# Patient Record
Sex: MALE | URBAN - METROPOLITAN AREA
[De-identification: names, ages, dates, MRNs, and addresses within clinical notes are randomized per-mention and may not be internally consistent; named-entity substitution may affect disease eponyms.]

---

## 2024-06-26 ENCOUNTER — TELEPHONE (OUTPATIENT)
Age: 59
End: 2024-06-26

## 2024-06-26 ENCOUNTER — OFFICE VISIT (OUTPATIENT)
Age: 59
End: 2024-06-26

## 2024-06-26 VITALS
HEART RATE: 66 BPM | OXYGEN SATURATION: 96 % | BODY MASS INDEX: 32.4 KG/M2 | SYSTOLIC BLOOD PRESSURE: 139 MMHG | DIASTOLIC BLOOD PRESSURE: 86 MMHG | HEIGHT: 72 IN | TEMPERATURE: 97.5 F | WEIGHT: 239.2 LBS

## 2024-06-26 DIAGNOSIS — D17.21 LIPOMA OF RIGHT UPPER EXTREMITY: Primary | ICD-10-CM

## 2024-06-26 RX ORDER — MONTELUKAST SODIUM 10 MG/1
10 TABLET ORAL DAILY
COMMUNITY
Start: 2024-06-10

## 2024-06-26 RX ORDER — METHYLPREDNISOLONE 4 MG/1
4 TABLET ORAL DAILY
COMMUNITY
Start: 2024-06-10

## 2024-06-26 ASSESSMENT — PATIENT HEALTH QUESTIONNAIRE - PHQ9
2. FEELING DOWN, DEPRESSED OR HOPELESS: NOT AT ALL
1. LITTLE INTEREST OR PLEASURE IN DOING THINGS: NOT AT ALL
SUM OF ALL RESPONSES TO PHQ QUESTIONS 1-9: 0
SUM OF ALL RESPONSES TO PHQ9 QUESTIONS 1 & 2: 0

## 2024-06-26 NOTE — PROGRESS NOTES
Subjective:       Aristeo Corado is a 58 y.o. male who presents for evaluation of lump on his shoulder. He has had it for a couple of years but its getting bigger. He is having discomfort from it.       Past Medical History:   Diagnosis Date    Asthma      Past Surgical History:   Procedure Laterality Date    TONSILLECTOMY      as a child     Social History     Socioeconomic History    Marital status: Unknown     Spouse name: None    Number of children: None    Years of education: None    Highest education level: None   Tobacco Use    Smoking status: Never    Smokeless tobacco: Never   Substance and Sexual Activity    Alcohol use: Yes     Comment: occasionaly    Drug use: Never     Current Outpatient Medications   Medication Sig Dispense Refill    montelukast (SINGULAIR) 10 MG tablet Take 1 tablet by mouth daily      methylPREDNISolone (MEDROL DOSEPACK) 4 MG tablet Take 1 tablet by mouth daily       No current facility-administered medications for this visit.     No Known Allergies    Review of Systems  Pertinent items are noted in HPI.      Objective:      /86 (Site: Left Upper Arm, Position: Sitting, Cuff Size: Large Adult)   Pulse 66   Temp 97.5 °F (36.4 °C) (Temporal)   Ht 1.829 m (6')   Wt 108.5 kg (239 lb 3.2 oz)   SpO2 96%   BMI 32.44 kg/m²     Physical Exam:  General:  Alert, cooperative, no distress, appears stated age.   Eyes:  Conjunctivae/corneas clear.    Ears:  Normal external ear canals both ears.   Nose: Nares normal. Septum midline.    Mouth/Throat: Lips, mucosa, and tongue normal. Teeth and gums normal.   Neck: Supple, symmetrical, trachea midline   Lungs:   Clear to auscultation bilaterally. No respiratory distress   Heart:  Regular rate and rhythm   Abdomen:   Soft, non-tender. Bowel sounds normal. No masses,  No organomegaly.   Extremities: Extremities normal, atraumatic, no cyanosis or edema.   Skin: Skin color, texture, turgor normal. No rashes or lesions. Right shoulder lipoma

## 2024-06-26 NOTE — PROGRESS NOTES
Identified pt with two pt identifiers (name and ). Reviewed chart in preparation for visit and have obtained necessary documentation.    Aristeo Corado is a 58 y.o. male  Chief Complaint   Patient presents with    New Patient     Seen at the request of patient first evaluate lipoma right shoulder      /86 (Site: Left Upper Arm, Position: Sitting, Cuff Size: Large Adult)   Pulse 66   Temp 97.5 °F (36.4 °C) (Temporal)   Ht 1.829 m (6')   Wt 108.5 kg (239 lb 3.2 oz)   SpO2 96%   BMI 32.44 kg/m²     1. Have you been to the ER, urgent care clinic since your last visit?  Hospitalized since your last visit?no    2. Have you seen or consulted any other health care providers outside of the [facility] since your last visit?  Include any pap smears or colon screening. no

## 2024-07-11 ENCOUNTER — PROCEDURE VISIT (OUTPATIENT)
Age: 59
End: 2024-07-11

## 2024-07-11 VITALS
HEART RATE: 95 BPM | HEIGHT: 72 IN | TEMPERATURE: 97.3 F | SYSTOLIC BLOOD PRESSURE: 107 MMHG | WEIGHT: 243.6 LBS | BODY MASS INDEX: 33 KG/M2 | OXYGEN SATURATION: 96 % | RESPIRATION RATE: 18 BRPM | DIASTOLIC BLOOD PRESSURE: 73 MMHG

## 2024-07-11 DIAGNOSIS — D17.21 LIPOMA OF RIGHT UPPER EXTREMITY: Primary | ICD-10-CM

## 2024-07-11 RX ORDER — LIDOCAINE HYDROCHLORIDE 10 MG/ML
10 INJECTION, SOLUTION EPIDURAL; INFILTRATION; INTRACAUDAL; PERINEURAL ONCE
Status: COMPLETED | OUTPATIENT
Start: 2024-07-11 | End: 2024-07-11

## 2024-07-11 RX ORDER — LIDOCAINE HYDROCHLORIDE AND EPINEPHRINE BITARTRATE 20; .01 MG/ML; MG/ML
10 INJECTION, SOLUTION SUBCUTANEOUS ONCE
Status: COMPLETED | OUTPATIENT
Start: 2024-07-11 | End: 2024-07-11

## 2024-07-11 RX ORDER — LIDOCAINE HYDROCHLORIDE 10 MG/ML
10 INJECTION, SOLUTION EPIDURAL; INFILTRATION; INTRACAUDAL; PERINEURAL ONCE
Status: CANCELLED | OUTPATIENT
Start: 2024-07-11 | End: 2024-07-11

## 2024-07-11 RX ADMIN — LIDOCAINE HYDROCHLORIDE AND EPINEPHRINE BITARTRATE 10 ML: 20; .01 INJECTION, SOLUTION SUBCUTANEOUS at 15:19

## 2024-07-11 RX ADMIN — LIDOCAINE HYDROCHLORIDE 10 ML: 10 INJECTION, SOLUTION EPIDURAL; INFILTRATION; INTRACAUDAL; PERINEURAL at 15:17

## 2024-07-11 ASSESSMENT — PATIENT HEALTH QUESTIONNAIRE - PHQ9
1. LITTLE INTEREST OR PLEASURE IN DOING THINGS: NOT AT ALL
SUM OF ALL RESPONSES TO PHQ9 QUESTIONS 1 & 2: 0
SUM OF ALL RESPONSES TO PHQ QUESTIONS 1-9: 0
2. FEELING DOWN, DEPRESSED OR HOPELESS: NOT AT ALL

## 2024-07-11 NOTE — PATIENT INSTRUCTIONS
Dr. Melton Discharge Instructions for:  Aristeo Corado    MRN: [id number] : 1965    Admitted: [unfilled]   Discharged: 2024       What to do at Home    Recommended diet: Resume your normal diet    Recommended activity: as tolerated    Follow-up with Dr. Melton in 1-2 weeks.  Call [phone] for an appointment.    Wound Care:  Keep wound clean and dry      How to Care for Your Wound After It’s Treated With DERMABOND* Topical Skin Adhesive    DERMABOND* Topical Skin Adhesive (2-octyl cyanoacrylate) is a sterile, liquid skin adhesivethat holds wound edges together. The film will usually remain in place for 5 to 10 days, thennaturally fall off your skin.  The following will answer some of your questions and provide instructions for proper care for yourwound while it is healing:    CHECK WOUND APPEARANCE   Some swelling, redness, and pain are common with all wounds and normally will go away as the wound heals. If swelling, redness, or pain increases or if the wound feels warm to the touch, contact a doctor. Also contact a doctor if the wound edges reopen or separate.    REPLACE BANDAGES   If your wound is bandaged, keep the bandage dry.   Replace the dressing daily until the adhesive film has fallen off or if the bandage should become wet, unless otherwise instructed by your physician.   When changing the dressing, do not place tape directly over the DERMABOND adhesive film, because removing the tape later may also remove the film.    AVOID TOPICAL MEDICATIONS   Do not apply liquid or ointment medications or any other product to your wound while the DERMABOND adhesive film is in place. These may loosen the film before your wound is healed.    KEEP WOUND DRY AND PROTECTED   You may occasionally and briefly wet your wound in the shower or bath. Do not soak or scrub your wound, do not swim, and avoid periods of heavy perspiration until the DERMABOND adhesive has naturally fallen off. After showering or

## 2024-07-11 NOTE — PROGRESS NOTES
Identified pt with two pt identifiers (name and ). Reviewed chart in preparation for visit and have obtained necessary documentation.    Aristeo Corado is a 58 y.o. male  Chief Complaint   Patient presents with    Procedure     Excision of right shoulder lipoma     /73 (Site: Right Upper Arm, Position: Sitting, Cuff Size: Large Adult)   Pulse 95   Temp 97.3 °F (36.3 °C) (Temporal)   Resp 18   Ht 1.829 m (6')   Wt 110.5 kg (243 lb 9.6 oz)   SpO2 96%   BMI 33.04 kg/m²     1. Have you been to the ER, urgent care clinic since your last visit?  Hospitalized since your last visit?no    2. Have you seen or consulted any other health care providers outside of the [facility] since your last visit?  Include any pap smears or colon screening. No    [unfilled]  OFFICE PROCEDURE PROGRESS NOTE        Chart reviewed for the following:   Carmen SÁNCHEZ LPN, have reviewed the History, Physical and updated the Allergic reactions for Aristeo Corado     TIME OUT performed immediately prior to start of procedure:   Carmen SÁNCHEZ LPN, have performed the following reviews on Aristeo Corado prior to the start of the procedure:            * Patient was identified by name and date of birth   * Agreement on procedure being performed and correct location was verified  * Risks and Benefits explained to the patient  * Procedure site verified and marked as necessary  * Patient was positioned for comfort  * Consent was signed and verified     Time: 3:21 pm      Date of procedure: 2024    Procedure performed by:  Lana Melton MD    Provider assisted by: Carmen Borja LPN    Patient assisted by: none    How tolerated by patient: tolerated the procedure well with no complications    Post Procedural Pain Scale: 0 - No Hurt    Comments: none    Specimen(s) sent to lab Yes    Name of local anesthetics/%/w w/o Epi: Lidocain without EPI and Lidocaine with epi    Lot#: [id number] and [id number]    Exp. Date:

## 2024-07-11 NOTE — PROGRESS NOTES
PROCEDURE NOTE    Preperative Diagnosis: right shoulder lipoma  Post-operative Diagnosis: right shoulder lipoma    Procedure: Excision of right shoulder lipoma    Surgeon: Lana Melton MD  Anesthesia: Local  Estimated Blood Loss: Minimal   Complications: None; patient tolerated the procedure well.      Arsiteo Corado is a 58 y.o. male who has been brought to the procedure room for procedure as above.    The risks, benefits, and alternatives were explained and consent was obtained for the procedure. The procedure was confirmed with the patient.  The area was sterile prepped and draped in the usual manner.  1% lidocaine with epinephrine was infiltrated into the skin and soft tissue surrounding the mass.  An incision was made.  The mass extended through the superficial fascia, but was not involving the deep fascia or muscle.  The mass was fatty, consistent with a lipoma.  It was bluntly dissected free of surrounding tissues and excised in its entirety and send for pathology.    Hemostasis was noted.  The superficial fascia was closed with continuous 3-0 Vicryl; followed by closure of the skin with 4-0 Monocryl.  The wound was covered with Dermabond.  Wound care instructions were given.  He tolerated the procedure without difficulty.

## 2024-07-24 LAB
CPT BILLING CODE: NORMAL
DIAGNOSIS SYNOPSIS:: NORMAL
DX ICD CODE: NORMAL
DX ICD CODE: NORMAL
PATH REPORT.FINAL DX SPEC: NORMAL
PATH REPORT.GROSS SPEC: NORMAL
PATH REPORT.RELEVANT HX SPEC: NORMAL
PATH REPORT.SITE OF ORIGIN SPEC: NORMAL
PATHOLOGIST NAME: NORMAL
PAYMENT PROCEDURE: NORMAL